# Patient Record
Sex: MALE | Race: WHITE | NOT HISPANIC OR LATINO | Employment: STUDENT | URBAN - METROPOLITAN AREA
[De-identification: names, ages, dates, MRNs, and addresses within clinical notes are randomized per-mention and may not be internally consistent; named-entity substitution may affect disease eponyms.]

---
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
## 2019-02-04 ENCOUNTER — OFFICE VISIT (OUTPATIENT)
Dept: AUDIOLOGY | Facility: CLINIC | Age: 19
End: 2019-02-04
Payer: COMMERCIAL

## 2019-02-04 ENCOUNTER — OFFICE VISIT (OUTPATIENT)
Dept: OTOLARYNGOLOGY | Facility: CLINIC | Age: 19
End: 2019-02-04
Payer: COMMERCIAL

## 2019-02-04 VITALS
OXYGEN SATURATION: 98 % | HEART RATE: 67 BPM | WEIGHT: 208 LBS | BODY MASS INDEX: 29.12 KG/M2 | DIASTOLIC BLOOD PRESSURE: 68 MMHG | HEIGHT: 71 IN | SYSTOLIC BLOOD PRESSURE: 126 MMHG

## 2019-02-04 DIAGNOSIS — J34.2 DEVIATED NASAL SEPTUM: ICD-10-CM

## 2019-02-04 DIAGNOSIS — H61.23 BILATERAL IMPACTED CERUMEN: ICD-10-CM

## 2019-02-04 DIAGNOSIS — H92.03 OTALGIA OF BOTH EARS: Primary | ICD-10-CM

## 2019-02-04 DIAGNOSIS — R04.0 EPISTAXIS: Primary | ICD-10-CM

## 2019-02-04 DIAGNOSIS — H69.90 DYSFUNCTION OF EUSTACHIAN TUBE, UNSPECIFIED LATERALITY: ICD-10-CM

## 2019-02-04 PROCEDURE — 92550 TYMPANOMETRY & REFLEX THRESH: CPT | Performed by: AUDIOLOGIST

## 2019-02-04 PROCEDURE — 99203 OFFICE O/P NEW LOW 30 MIN: CPT | Mod: 25 | Performed by: OTOLARYNGOLOGY

## 2019-02-04 PROCEDURE — 92557 COMPREHENSIVE HEARING TEST: CPT | Performed by: AUDIOLOGIST

## 2019-02-04 PROCEDURE — 30901 CONTROL OF NOSEBLEED: CPT | Performed by: OTOLARYNGOLOGY

## 2019-02-04 PROCEDURE — 99207 ZZC NO CHARGE LOS: CPT | Performed by: AUDIOLOGIST

## 2019-02-04 PROCEDURE — 69210 REMOVE IMPACTED EAR WAX UNI: CPT | Mod: 51 | Performed by: OTOLARYNGOLOGY

## 2019-02-04 ASSESSMENT — MIFFLIN-ST. JEOR: SCORE: 1980.61

## 2019-02-04 NOTE — LETTER
2/4/2019         RE: Aditya Allen  [address] [facility] [address]        Dear Colleague,    Thank you for referring your patient, Aditya Allen, to the [facility]. Please see a copy of my visit note below.    ENT Consultation    Aditya Allen is a 18 year old male who is seen in consultation at the request of self referred.      History of Present Illness - Aditya Allen is a 18 year old male here today for concerns of hearing loss. Patient does Fields but wears hearing protection. Patient does also have occasional bloody nose.  Regard to his ears he does get crackling sounds in both ears left slightly worse than right.  He does have allergies to use Allegra using Claritin.  He denies use nasal steroid sprays because of history of nosebleeds especially the left side.  Other than that really that does not seem to help his ears.  Feel a bit plugged but again the crackling and popping noises been bothering him.  He does not have any other history of bleeding disorders no bleeding gums or easy bruising.  Body mass index is 29.01 kg/m .        BP Readings from Last 1 Encounters:   02/04/19 126/68 (62 %/ 33 %)*     *BP percentiles are based on the August 2017 AAP Clinical Practice Guideline for boys       BP noted to be well controlled today in office.     Aditya IS NOT a smoker/uses chewing tobacco.        Past Medical History -   Past Medical History:   Diagnosis Date     Fever and other physiologic disturbances of temperature regulation     pyrexia       Current Medications -   Current Outpatient Medications:      loratadine (CLARITIN) 10 MG tablet, Take 1 tablet (10 mg) by mouth daily, Disp: , Rfl:      VITAMIN D, CHOLECALCIFEROL, PO, Take by mouth daily Reported on 3/17/2017, Disp: , Rfl:     Allergies -   Allergies   Allergen Reactions     No Known Allergies        Social History -   Social History     Socioeconomic History     Marital status: Single     Spouse name: Not on file      "Number of children: 0     Years of education: Not on file     Highest education level: Not on file   Social Needs     Financial resource strain: Not on file     Food insecurity - worry: Not on file     Food insecurity - inability: Not on file     Transportation needs - medical: Not on file     Transportation needs - non-medical: Not on file   Occupational History     Not on file   Tobacco Use     Smoking status: Never Smoker     Smokeless tobacco: Never Used   Substance and Sexual Activity     Alcohol use: No     Drug use: No     Sexual activity: No   Other Topics Concern     Not on file   Social History Narrative     Not on file       Family History - No family history on file.    Review of Systems - As per HPI and PMHx, otherwise review of system review of the head and neck negative. Otherwise 10+ review systems negative.    Physical Exam  /68   Pulse 67   Ht 1.803 m (5' 11\")   Wt 94.3 kg (208 lb)   SpO2 98%   BMI 29.01 kg/m     BMI: Body mass index is 29.01 kg/m .    General - The patient is well nourished and well developed, and appears to have good nutritional status.  Alert and oriented to person and place, answers questions and cooperates with examination appropriately.    SKIN - No suspicious lesions or rashes.  Respiration - No respiratory distress.  Head and Face - Normocephalic and atraumatic, with no gross asymmetry noted of the contour of the facial features.  The facial nerve is intact, with strong symmetric movements.    Voice and Breathing - The patient was breathing comfortably without the use of accessory muscles. The patients voice was clear and strong, and had appropriate pitch and quality.    Ears - Bilateral pinna and EACs with normal appearing overlying skin. Tympanic membrane intact with good mobility on pneumatic otoscopy bilaterally. Bony landmarks of the ossicular chain are normal. The tympanic membranes are normal in appearance. No retraction, perforation, or masses.  No fluid " or purulence was seen in the external canal or the middle ear.     Eyes - Extraocular movements intact.  Sclera were not icteric or injected, conjunctiva were pink and moist.    Mouth - Examination of the oral cavity showed pink, healthy oral mucosa. No lesions or ulcerations noted.  The tongue was mobile and midline, and the dentition were in good condition.      Throat - The walls of the oropharynx were smooth, pink, moist, symmetric, and had no lesions or ulcerations.  The tonsillar pillars and soft palate were symmetric.  The uvula was midline on elevation.    Neck - Normal midline excursion of the laryngotracheal complex during swallowing.  Full range of motion on passive movement.  Palpation of the occipital, submental, submandibular, internal jugular chain, and supraclavicular nodes did not demonstrate any abnormal lymph nodes or masses.  The carotid pulse was palpable bilaterally.  Palpation of the thyroid was soft and smooth, with no nodules or goiter appreciated.  The trachea was mobile and midline.    Nose - External contour is symmetric, no gross deflection or scars.  Nasal mucosa is shows prominent blood vessels pressure in the left side.  Blood vessel is beginning to bleed.  Patient has deviated septum to the left side some of the large inferior turbinates little bit of pallor.  Neuro - Nonfocal neuro exam is normal, CN 2 through 12 intact, normal gait and muscle tone.      Performed in clinic today:  Ears - On examination of the ears, I found that both ears were impacted with cerumen.  , I positioned the patient in the examination chair in a semi-supine position. I used the magnified speculum /binocular surgical microscope to perform cerumen removal.  On the right side, I began by using a cerumen loop to gently lift the edges of the cerumen mass away from the walls of the external canal.  Once I did this, I was able to use alligator froceps to  pull/suction away fragments of wax and debris. I removed  all the wax and debri.  The tympanic membrane was intact, no sign of perforation or middle ear effusion. and On the left side once again using the magnified speculum/ binocular surgical microscope to perform cerumen removal.  I began by using a cerumen loop to gently lift the edges of the cerumen mass away from the walls of the external canal.  Once I did this, I was able to use alligator froceps to pull/suction away fragments of wax and debris. I removed all the wax and debri. The tympanic membrane was intact, no sign of perforation or middle ear effusion.  Cerumen was very close to TM on each side.      Also control of anterior epistaxis was performed on the left side using silver nitrate.  Patient was topically anesthetized with 4% tetracaine gel anteriorly.  After about 10 minutes V silver nitrate with magnified speculum to carefully treat the blood vessel anteriorly that was beginning to bleed.  He tolerated very well.    A/P - Aditya Allen is a 18 year old male with cerumen impaction partly causing some of his feeling of crackling and popping and pressure in the ears.  Certainly there is also eustachian tube dysfunction issue.  We discussed using nasal saline that will help with nosebleeds as well as eustachian tube dysfunction.  He will see us back as needed.  In regard to epistaxis again good humidification is very important at this point.      Wale Gonzales MD    Again, thank you for allowing me to participate in the care of your patient.        Sincerely,        Wale Gonzales MD, MD

## 2019-02-04 NOTE — PROGRESS NOTES
AUDIOLOGY REPORT     SUMMARY: Audiology visit completed. See audiogram for results.     RECOMMENDATIONS: Follow-up with ENT    Jordan Wong Licensed Audiologist #[id number]

## 2019-02-04 NOTE — PROGRESS NOTES
ENT Consultation    Aditya Allen is a 18 year old male who is seen in consultation at the request of self referred.      History of Present Illness - Aditya Allen is a 18 year old male here today for concerns of hearing loss. Patient does Fields but wears hearing protection. Patient does also have occasional bloody nose.  Regard to his ears he does get crackling sounds in both ears left slightly worse than right.  He does have allergies to use Allegra using Claritin.  He denies use nasal steroid sprays because of history of nosebleeds especially the left side.  Other than that really that does not seem to help his ears.  Feel a bit plugged but again the crackling and popping noises been bothering him.  He does not have any other history of bleeding disorders no bleeding gums or easy bruising.  Body mass index is 29.01 kg/m .        BP Readings from Last 1 Encounters:   02/04/19 126/68 (62 %/ 33 %)*     *BP percentiles are based on the August 2017 AAP Clinical Practice Guideline for boys       BP noted to be well controlled today in office.     Aditya IS NOT a smoker/uses chewing tobacco.        Past Medical History -   Past Medical History:   Diagnosis Date     Fever and other physiologic disturbances of temperature regulation     pyrexia       Current Medications -   Current Outpatient Medications:      loratadine (CLARITIN) 10 MG tablet, Take 1 tablet (10 mg) by mouth daily, Disp: , Rfl:      VITAMIN D, CHOLECALCIFEROL, PO, Take by mouth daily Reported on 3/17/2017, Disp: , Rfl:     Allergies -   Allergies   Allergen Reactions     No Known Allergies        Social History -   Social History     Socioeconomic History     Marital status: Single     Spouse name: Not on file     Number of children: 0     Years of education: Not on file     Highest education level: Not on file   Social Needs     Financial resource strain: Not on file     Food insecurity - worry: Not on file     Food insecurity - inability: Not on file  "    Transportation needs - medical: Not on file     Transportation needs - non-medical: Not on file   Occupational History     Not on file   Tobacco Use     Smoking status: Never Smoker     Smokeless tobacco: Never Used   Substance and Sexual Activity     Alcohol use: No     Drug use: No     Sexual activity: No   Other Topics Concern     Not on file   Social History Narrative     Not on file       Family History - No family history on file.    Review of Systems - As per HPI and PMHx, otherwise review of system review of the head and neck negative. Otherwise 10+ review systems negative.    Physical Exam  /68   Pulse 67   Ht 1.803 m (5' 11\")   Wt 94.3 kg (208 lb)   SpO2 98%   BMI 29.01 kg/m    BMI: Body mass index is 29.01 kg/m .    General - The patient is well nourished and well developed, and appears to have good nutritional status.  Alert and oriented to person and place, answers questions and cooperates with examination appropriately.    SKIN - No suspicious lesions or rashes.  Respiration - No respiratory distress.  Head and Face - Normocephalic and atraumatic, with no gross asymmetry noted of the contour of the facial features.  The facial nerve is intact, with strong symmetric movements.    Voice and Breathing - The patient was breathing comfortably without the use of accessory muscles. The patients voice was clear and strong, and had appropriate pitch and quality.    Ears - Bilateral pinna and EACs with normal appearing overlying skin. Tympanic membrane intact with good mobility on pneumatic otoscopy bilaterally. Bony landmarks of the ossicular chain are normal. The tympanic membranes are normal in appearance. No retraction, perforation, or masses.  No fluid or purulence was seen in the external canal or the middle ear.     Eyes - Extraocular movements intact.  Sclera were not icteric or injected, conjunctiva were pink and moist.    Mouth - Examination of the oral cavity showed pink, healthy oral " mucosa. No lesions or ulcerations noted.  The tongue was mobile and midline, and the dentition were in good condition.      Throat - The walls of the oropharynx were smooth, pink, moist, symmetric, and had no lesions or ulcerations.  The tonsillar pillars and soft palate were symmetric.  The uvula was midline on elevation.    Neck - Normal midline excursion of the laryngotracheal complex during swallowing.  Full range of motion on passive movement.  Palpation of the occipital, submental, submandibular, internal jugular chain, and supraclavicular nodes did not demonstrate any abnormal lymph nodes or masses.  The carotid pulse was palpable bilaterally.  Palpation of the thyroid was soft and smooth, with no nodules or goiter appreciated.  The trachea was mobile and midline.    Nose - External contour is symmetric, no gross deflection or scars.  Nasal mucosa is shows prominent blood vessels pressure in the left side.  Blood vessel is beginning to bleed.  Patient has deviated septum to the left side some of the large inferior turbinates little bit of pallor.  Neuro - Nonfocal neuro exam is normal, CN 2 through 12 intact, normal gait and muscle tone.      Performed in clinic today:  Ears - On examination of the ears, I found that both ears were impacted with cerumen.  , I positioned the patient in the examination chair in a semi-supine position. I used the magnified speculum /binocular surgical microscope to perform cerumen removal.  On the right side, I began by using a cerumen loop to gently lift the edges of the cerumen mass away from the walls of the external canal.  Once I did this, I was able to use alligator froceps to  pull/suction away fragments of wax and debris. I removed all the wax and debri.  The tympanic membrane was intact, no sign of perforation or middle ear effusion. and On the left side once again using the magnified speculum/ binocular surgical microscope to perform cerumen removal.  I began by using  a cerumen loop to gently lift the edges of the cerumen mass away from the walls of the external canal.  Once I did this, I was able to use alligator froceps to pull/suction away fragments of wax and debris. I removed all the wax and debri. The tympanic membrane was intact, no sign of perforation or middle ear effusion.  Cerumen was very close to TM on each side.      Also control of anterior epistaxis was performed on the left side using silver nitrate.  Patient was topically anesthetized with 4% tetracaine gel anteriorly.  After about 10 minutes V silver nitrate with magnified speculum to carefully treat the blood vessel anteriorly that was beginning to bleed.  He tolerated very well.    A/P - Aditya Allen is a 18 year old male with cerumen impaction partly causing some of his feeling of crackling and popping and pressure in the ears.  Certainly there is also eustachian tube dysfunction issue.  We discussed using nasal saline that will help with nosebleeds as well as eustachian tube dysfunction.  He will see us back as needed.  In regard to epistaxis again good humidification is very important at this point.      Wale Gonzales MD

## 2019-04-02 NOTE — PROGRESS NOTES
Outpatient Physical Therapy Discharge Note     Patient: Aditya Allen  : 2000    Beginning/End Dates of Reporting Period:  2018 to 2018  Aditya has been seen for a total of 4 visits overall.    Referring Provider: Dr. Zendejas    Therapy Diagnosis: Left ankle stiffness following ORIF of fibula     Client Self Report: Reports that he can still feel the plate in the ankle. He states that he is always aware of it and that hasn't changed even with the improved ankle ROM. He rates it as a 1/10. He would like to continue to work on his ROM exercises but if in a year from now he can still feel it he will look into having the hardware removed.     Objective Measurements:  Objective Measure: LEFS  Details:     Objective Measure: ROM  Details: DF 15, PF 70, Inv. 32, Ever 12.    Objective Measure: SLS  Details: 1 min.     Objective Measure: Strength  Details: 5/5 throughout the ankle.      Treatment: Ankle ROM, stretching, balance and proprioception, HEP.    Goals:  Goal Identifier Home management   Goal Description Pt. will demonstrate independence with his home program.   Target Date 18   Date Met  18   Progress:     Goal Identifier ROM   Goal Description Pt. will demonstrate left ankle ROM equal to that of the right, demonstrating a return to normal ROM   Target Date 18   Date Met  18   Progress:     Goal Identifier Function   Goal Description Pt. will report that he no longer feels stiffness in the left ankle, return to symptom free walking and running.   Target Date 19   Date Met      Progress:     Progress Toward Goals:   Progress this reporting period: Good progress toward goals, continues to work on the 3rd goal.     Plan:  Discharge from therapy.    Discharge: Yes    Reason for Discharge: Independent with home program, meeting goals.     Equipment Issued: Theraband    Discharge Plan: Patient to continue home program.    Thank you for this referral.    Emma Mejia  DAKOTA

## 2019-04-02 NOTE — ADDENDUM NOTE
Encounter addended by: Emma Mejia, PT on: 4/2/2019 9:49 AM   Actions taken: Episode resolved, Sign clinical note

## 2019-04-28 ENCOUNTER — HOSPITAL ENCOUNTER (EMERGENCY)
Facility: CLINIC | Age: 19
Discharge: HOME | End: 2019-04-28
Attending: FAMILY MEDICINE | Admitting: FAMILY MEDICINE
Payer: COMMERCIAL

## 2019-04-28 ENCOUNTER — APPOINTMENT (OUTPATIENT)
Dept: GENERAL RADIOLOGY | Facility: CLINIC | Age: 19
End: 2019-04-28
Attending: FAMILY MEDICINE
Payer: COMMERCIAL

## 2019-04-28 VITALS
HEART RATE: 67 BPM | TEMPERATURE: 97.9 F | DIASTOLIC BLOOD PRESSURE: 83 MMHG | RESPIRATION RATE: 18 BRPM | OXYGEN SATURATION: 98 % | SYSTOLIC BLOOD PRESSURE: 129 MMHG

## 2019-04-28 DIAGNOSIS — S62.341A CLOSED NONDISPLACED FRACTURE OF BASE OF SECOND METACARPAL BONE OF LEFT HAND, INITIAL ENCOUNTER: Primary | ICD-10-CM

## 2019-04-28 PROCEDURE — 25000132 ZZH RX MED GY IP 250 OP 250 PS 637: Performed by: FAMILY MEDICINE

## 2019-04-28 PROCEDURE — 26600 TREAT METACARPAL FRACTURE: CPT | Mod: LT | Performed by: FAMILY MEDICINE

## 2019-04-28 PROCEDURE — 99283 EMERGENCY DEPT VISIT LOW MDM: CPT | Mod: 25 | Performed by: FAMILY MEDICINE

## 2019-04-28 PROCEDURE — 99284 EMERGENCY DEPT VISIT MOD MDM: CPT | Mod: 25 | Performed by: FAMILY MEDICINE

## 2019-04-28 PROCEDURE — 73130 X-RAY EXAM OF HAND: CPT | Mod: TC,LT

## 2019-04-28 RX ORDER — HYDROCODONE BITARTRATE AND ACETAMINOPHEN 5; 325 MG/1; MG/1
1 TABLET ORAL ONCE
Status: COMPLETED | OUTPATIENT
Start: 2019-04-28 | End: 2019-04-28

## 2019-04-28 RX ORDER — IBUPROFEN 800 MG/1
800 TABLET, FILM COATED ORAL ONCE
Status: COMPLETED | OUTPATIENT
Start: 2019-04-28 | End: 2019-04-28

## 2019-04-28 RX ADMIN — HYDROCODONE BITARTRATE AND ACETAMINOPHEN 1 TABLET: 5; 325 TABLET ORAL at 00:49

## 2019-04-28 RX ADMIN — IBUPROFEN 800 MG: 800 TABLET ORAL at 00:49
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
## 2019-04-28 NOTE — ED PROVIDER NOTES
ED Provider Note     Aditya Allen  [id number]  April 28, 2019      CC:     Chief Complaint   Patient presents with     Hand Injury          History is obtained from the patient.  He is accompanied by his mother and girlfriend.    HPI: Aditya Allen is a 18 year old male presenting with injury to the left hand especially over the thumb and index finger, at the second metacarpal phalangeal joint.  Patient was at a bonfire, and was trying to break a piece of log against a tree.  The long piece of log and came back and hit him in between the webspace of the thumb and index finger.  Patient reports intense pain initially, resulting in some nausea.  Pain is now subsiding to level of 7/10.  Patient was nauseated earlier.  He has inability to fully bend the index finger, and most of the pain is at the second MCP joint.  Patient is right-hand dominant.  He has not taken anything for the pain.  He has a history of seasonal allergies and allergy to cats.  He takes Zyrtec as needed.      PMH/Problem List:   Past Medical History:   Diagnosis Date     Fever and other physiologic disturbances of temperature regulation        PSH:   Past Surgical History:   Procedure Laterality Date     EXAM UNDER ANESTHESIA, MANIPULATE JOINT (LOCATION) Left 1/6/2015    Procedure: EXAM UNDER ANESTHESIA, MANIPULATE JOINT (LOCATION);  Surgeon: Bobo Zendejas DO;  Location: [facility]     NO HISTORY OF SURGERY       OPEN REDUCTION INTERNAL FIXATION ANKLE Left 1/6/2015    Procedure: OPEN REDUCTION INTERNAL FIXATION ANKLE;  Surgeon: Bobo Zednejas DO;  Location: [facility]       MEDS:     No current facility-administered medications on file prior to encounter.   Current Outpatient Medications on File Prior to Encounter:  loratadine (CLARITIN) 10 MG tablet Take 1 tablet (10 mg) by mouth daily   VITAMIN D, CHOLECALCIFEROL, PO Take by mouth daily Reported on 3/17/2017        Allergies: No known allergies    Triage and nursing notes were reviewed.    ROS: All other systems were reviewed and are negative    Physical Exam:  Vitals:    04/28/19 0037 04/28/19 0136 04/28/19 0143   BP: (!) 142/99 129/83 129/83   Pulse: 79 66 67   Resp: 18     Temp: 97.9  F (36.6  C)     TempSrc: Oral     SpO2: 98% 98% 98%     GENERAL APPEARANCE: Alert and oriented x3, mild distress due to pain  HEAD: atraumatic  EYES: PER  HENT: Normal external exam  RESP: Normal respiratory effort  EXT: Left hand with swelling over the proximal phalanx, and tenderness over the second MCP joint.  There is some tenderness over the first MCP joint but there is no deformity.  Distal phalanx and nailbed appears normal with good cap refill.  SKIN: no rash; as above  NEURO: mentation and speech normal; no focal deficits    Labs/Imaging Results:  Results for orders placed or performed during the hospital encounter of 04/28/19 (from the past 24 hour(s))   XR Hand Left G/E 3 Views    Narrative    XR HAND LT G/E 3 VW 4/28/2019 12:50 AM    HISTORY: Trauma; Log hit him in the webspace of thumb and index  finger.    COMPARISON: None.    FINDINGS: Nondisplaced fracture through the base of the second  metacarpal. Osseous structures otherwise appear intact.      Impression    IMPRESSION: Nondisplaced fracture through the base of the second  metacarpal.    JENNY RIOS MD           Impression:  Final diagnoses:   Closed nondisplaced fracture of base of second metacarpal bone of left hand         ED Course & Medical Decision Making (Plan):  Aditya Allen is a 18 year old male with left hand injury sustained tonight as he was trying to break a long piece of log against a tree.  He came back and struck him in the webspace between the thumb and index finger.  He thought that he might of dislocated the left index finger.  The patient arrived in the emergency department approximately 30 minutes after the injury.  He has pain in the first  metacarpal phalangeal joint and along the mid hand.  There is swelling of the left index finger.  The right thumb appears normal with no deformity.  X-ray of the left hand reveals nondisplaced fracture through the base of the second metacarpal bone.  We reviewed the x-rays together, and patient was then placed in a wrist splint to minimize movement of the hand and wrist.  He will still be able to use his fingers.  I asked him to ice and elevate frequently for the next couple of days.  Patient received ibuprofen and 1 dose of Vicodin in the ED.  Continue ibuprofen at home and add Tylenol for breakthrough pain.  Follow-up in the clinic in approximately 5 days for recheck.  Return to the ED at any time if symptoms worsen.  Patient will likely need nonsurgical management of his proximal metacarpal fracture.    Written after-visit summary and instructions were given at the time of discharge.          Follow Up Plan:  Clinic, [address] [id number] [phone]    In 5 days      [facility] Emergency Department  [address]   [place] [place] [address] [phone]    If symptoms worsen        Discharge Meds: None        This note was completed in part using Dragon voice recognition, and may contain word and grammatical errors.        Cynthia Hawley MD  04/28/19 0349

## 2019-04-28 NOTE — DISCHARGE INSTRUCTIONS
You have a hand fracture involving the left second metacarpal bone at the base.  Please see the attached handout.  Ice and elevate as much as possible for the next several days.  Wear the wrist splint for comfort.  Follow-up in the clinic in 5-7 days for recheck.  Take ibuprofen up to 600 mg 4 times a day with food.  Add Tylenol for breakthrough pain.

## 2019-04-28 NOTE — ED AVS SNAPSHOT
[facility] Emergency Department  [address] [place] [address]  Phone:  [phone]  Fax:  [phone]                                    Aditya Allen   MRN: [id number]    Department:  [facility] Emergency Department   Date of Visit:  4/28/2019           After Visit Summary Signature Page    I have received my discharge instructions, and my questions have been answered. I have discussed any challenges I see with this plan with the nurse or doctor.    ..........................................................................................................................................  Patient/Patient Representative Signature      ..........................................................................................................................................  Patient Representative Print Name and Relationship to Patient    ..................................................               ................................................  Date                                   Time    ..........................................................................................................................................  Reviewed by Signature/Title    ...................................................              ..............................................  Date                                               Time          22EPIC Rev 08/18

## 2019-04-29 NOTE — PROGRESS NOTES
SUBJECTIVE:   Aditya Allen is a 18 year old male who presents to clinic today for the following health issues:      HPI  ED/UC Followup:    Facility:  [facility]  Date of visit: 04/28/19  Reason for visit: Closed nondisplaced fracture of base of second metacarpal bone of left hand       Current Status: Patient states he has no pain unless he bumps the hand or pulls hard on a door handle       Additional history: Nondisplaced fracture of the base the second metatarsal noted on 28 April 2019 well preparing a bonfire.  Minimal discomfort is noted unless he misuses the hand.  We stressed that he needs to be very careful with the use of this hand for the next 6 weeks.    Reviewed and updated as needed this visit by clinical staff         Reviewed and updated as needed this visit by Provider         Patient Active Problem List   Diagnosis     Allergic rhinitis     Patellar tendinitis of right knee     Closed bimalleolar fracture     Closed fracture of ankle     Nondisplaced fracture of base of second metacarpal bone, left hand, initial encounter for closed fracture     Past Surgical History:   Procedure Laterality Date     EXAM UNDER ANESTHESIA, MANIPULATE JOINT (LOCATION) Left 1/6/2015    Procedure: EXAM UNDER ANESTHESIA, MANIPULATE JOINT (LOCATION);  Surgeon: Bobo Zendejas DO;  [facility]     NO HISTORY OF SURGERY       OPEN REDUCTION INTERNAL FIXATION ANKLE Left 1/6/2015    Procedure: OPEN REDUCTION INTERNAL FIXATION ANKLE;  Surgeon: Bobo Zendejas DO;  Location: [facility]       Social History     Tobacco Use     Smoking status: Never Smoker     Smokeless tobacco: Never Used   Substance Use Topics     Alcohol use: No     History reviewed. No pertinent family history.      Current Outpatient Medications   Medication Sig Dispense Refill     loratadine (CLARITIN) 10 MG tablet Take 1 tablet (10 mg) by mouth daily       VITAMIN D, CHOLECALCIFEROL, PO Take by mouth daily Reported on 3/17/2017    "    Allergies   Allergen Reactions     No Known Allergies      No lab results found.   BP Readings from Last 3 Encounters:   05/03/19 118/60   04/28/19 129/83   02/04/19 126/68    Wt Readings from Last 3 Encounters:   05/03/19 96.1 kg (211 lb 12.8 oz) (95 %)*   02/04/19 94.3 kg (208 lb) (95 %)*   11/01/18 94.3 kg (208 lb) (95 %)*     * Growth percentiles are based on CDC (Boys, 2-20 Years) data.                  Labs reviewed in EPIC    ROS:  Constitutional, HEENT, cardiovascular, pulmonary, gi and gu systems are negative, except as otherwise noted.    OBJECTIVE:     /60 (Cuff Size: Adult Large)   Pulse 80   Temp 98.7  F (37.1  C) (Temporal)   Resp 16   Ht 1.799 m (5' 10.83\")   Wt 96.1 kg (211 lb 12.8 oz)   BMI 29.68 kg/m    Body mass index is 29.68 kg/m .  GENERAL: healthy, alert and no distress  MS: Left hand is reviewed today with the patient he has minimal pain and tenderness over the site of the fracture to the anterior aspect.  Posterior aspect there is some pain to this region.  Some mild swelling is still noted with respect to this fracture area.  No bruising is present at this point in time.  We reviewed the x-rays with him while in the clinic today.  And note a very minimally displaced fracture at the site listed.  SKIN: no suspicious lesions or rashes to visible skin  NEURO: Normal strength and tone, mentation intact and speech normal  PSYCH: mentation appears normal, affect normal/bright    Diagnostic Test Results:  No results found for this or any previous visit (from the past 24 hour(s)).    ASSESSMENT/PLAN:     1. Nondisplaced fracture of base of second metacarpal bone, left hand, initial encounter for closed fracture  At this point time we discussed the options of potential for casting or continued use of splint.  He is hesitant to use a cast since this will limit what he is able to do with respect to his employment or his volunteer activity.  I advised that he continue to use the splint " that he was granted through the ER and if no improvement is noted over the next week to be seen by orthopedic for further evaluation and consideration of treatment options.  - ORTHOPEDICS ADULT REFERRAL    TAMRA ZavalaC  [facility]

## 2019-05-03 ENCOUNTER — OFFICE VISIT (OUTPATIENT)
Dept: FAMILY MEDICINE | Facility: OTHER | Age: 19
End: 2019-05-03
Payer: COMMERCIAL

## 2019-05-03 VITALS
SYSTOLIC BLOOD PRESSURE: 118 MMHG | RESPIRATION RATE: 16 BRPM | DIASTOLIC BLOOD PRESSURE: 60 MMHG | TEMPERATURE: 98.7 F | HEART RATE: 80 BPM | HEIGHT: 71 IN | BODY MASS INDEX: 29.65 KG/M2 | WEIGHT: 211.8 LBS

## 2019-05-03 DIAGNOSIS — S62.341A NONDISPLACED FRACTURE OF BASE OF SECOND METACARPAL BONE, LEFT HAND, INITIAL ENCOUNTER FOR CLOSED FRACTURE: Primary | ICD-10-CM

## 2019-05-03 PROCEDURE — 99213 OFFICE O/P EST LOW 20 MIN: CPT | Performed by: PHYSICIAN ASSISTANT

## 2019-05-03 ASSESSMENT — MIFFLIN-ST. JEOR: SCORE: 1995.1

## 2019-05-03 ASSESSMENT — PAIN SCALES - GENERAL: PAINLEVEL: NO PAIN (0)

## 2020-03-11 DIAGNOSIS — Z01.84 IMMUNITY STATUS TESTING: Primary | ICD-10-CM

## 2020-03-12 DIAGNOSIS — Z01.84 IMMUNITY STATUS TESTING: ICD-10-CM

## 2020-03-12 LAB — HBV SURFACE AB SERPL IA-ACNC: 1.21 M[IU]/ML

## 2020-03-12 PROCEDURE — 86706 HEP B SURFACE ANTIBODY: CPT | Performed by: FAMILY MEDICINE

## 2020-03-12 PROCEDURE — 36415 COLL VENOUS BLD VENIPUNCTURE: CPT | Performed by: FAMILY MEDICINE

## 2020-05-11 ENCOUNTER — ALLIED HEALTH/NURSE VISIT (OUTPATIENT)
Dept: FAMILY MEDICINE | Facility: OTHER | Age: 20
End: 2020-05-11

## 2020-05-11 DIAGNOSIS — Z23 NEED FOR VACCINATION: Primary | ICD-10-CM

## 2020-05-11 PROCEDURE — 90471 IMMUNIZATION ADMIN: CPT

## 2020-05-11 PROCEDURE — 90746 HEPB VACCINE 3 DOSE ADULT IM: CPT

## 2020-05-11 PROCEDURE — 99207 ZZC NO CHARGE NURSE ONLY: CPT

## 2020-05-11 NOTE — PROGRESS NOTES
Chief Complaint   Patient presents with     Allied Health Visit     #1 afer antibody testing     Prior to immunization administration, verified patients identity using patient s name and date of birth. Please see Immunization Activity for additional information.     Screening Questionnaire for Adult Immunization    Are you sick today?   No   Do you have allergies to medications, food, a vaccine component or latex?   No   Have you ever had a serious reaction after receiving a vaccination?   No   Do you have a long-term health problem with heart, lung, kidney, or metabolic disease (e.g., diabetes), asthma, a blood disorder, no spleen, complement component deficiency, a cochlear implant, or a spinal fluid leak?  Are you on long-term aspirin therapy?   No   Do you have cancer, leukemia, HIV/AIDS, or any other immune system problem?   No   Do you have a parent, brother, or sister with an immune system problem?   No   In the past 3 months, have you taken medications that affect  your immune system, such as prednisone, other steroids, or anticancer drugs; drugs for the treatment of rheumatoid arthritis, Crohn s disease, or psoriasis; or have you had radiation treatments?   No   Have you had a seizure, or a brain or other nervous system problem?   No   During the past year, have you received a transfusion of blood or blood    products, or been given immune (gamma) globulin or antiviral drug?   No   For women: Are you pregnant or is there a chance you could become       pregnant during the next month?   No   Have you received any vaccinations in the past 4 weeks?   No     Immunization questionnaire answers were all negative.        Per orders of Dr. Leong, injection of hep b given by Xin Mcdonough CMA. Patient instructed to remain in clinic for 15 minutes afterwards, and to report any adverse reaction to me immediately.       Screening performed by Xin Mcdonough CMA on 5/11/2020 at 9:24 AM.'

## 2022-11-17 DIAGNOSIS — Z31.41 FERTILITY TESTING: Primary | ICD-10-CM

## 2022-11-28 ENCOUNTER — LAB (OUTPATIENT)
Dept: LAB | Facility: CLINIC | Age: 22
End: 2022-11-28
Payer: COMMERCIAL

## 2022-11-28 DIAGNOSIS — Z31.41 FERTILITY TESTING: ICD-10-CM

## 2022-11-28 PROCEDURE — 89322 SEMEN ANAL STRICT CRITERIA: CPT

## 2022-11-29 LAB
ABNORMAL SPERM MORPHOLOGY: 92
ABSTINENCE DAYS: 3 DAYS (ref 2–7)
AGGLUTINATION: NO
ANALYSIS TEMP - CENTIGRADE: 22 CENTIGRADE
COLLECTION METHOD: NORMAL
COLLECTION SITE: NORMAL
CONSENT TO RELEASE TO PARTNER: YES
DAL- RECEIVED TIME: NORMAL
HEAD DEFECT: 90 %
IMMOTILE: 51 %
LIQUEFIED: YES
MIDPIECE DEFECT: 28 %
NON-PROGRESSIVE MOTILITY: 3 %
NORMAL SPERM MORPHOLOGY: 8 % NORMAL FORMS
PROGRESSIVE MOTILITY: 46 %
ROUND CELLS: 0.1 MILLION/ML
SPECIMEN PH: 8 PH
SPECIMEN VOLUME: 1.7 ML
SPERM CONCENTRATION: 61.8 MILLION/ML
TAIL DEFECT: 3 %
TIME OF ANALYSIS: NORMAL
TOTAL PROGRESSIVE MOTILE NUMBER: 48 MILLION
TOTAL SPERM NUMBER: 105 MILLION
VISCOUS: NO
VITALITY: NORMAL

## 2023-02-18 ENCOUNTER — HEALTH MAINTENANCE LETTER (OUTPATIENT)
Age: 23
End: 2023-02-18

## 2024-03-16 ENCOUNTER — HEALTH MAINTENANCE LETTER (OUTPATIENT)
Age: 24
End: 2024-03-16

## 2025-03-22 ENCOUNTER — HEALTH MAINTENANCE LETTER (OUTPATIENT)
Age: 25
End: 2025-03-22

## 2025-07-29 ENCOUNTER — HOSPITAL ENCOUNTER (EMERGENCY)
Facility: CLINIC | Age: 25
Discharge: HOME | End: 2025-07-29
Attending: EMERGENCY MEDICINE
Payer: COMMERCIAL

## 2025-07-29 ENCOUNTER — OFFICE VISIT (OUTPATIENT)
Dept: URGENT CARE | Facility: URGENT CARE | Age: 25
End: 2025-07-29
Payer: COMMERCIAL

## 2025-07-29 ENCOUNTER — APPOINTMENT (OUTPATIENT)
Dept: ULTRASOUND IMAGING | Facility: CLINIC | Age: 25
End: 2025-07-29
Attending: EMERGENCY MEDICINE
Payer: COMMERCIAL

## 2025-07-29 VITALS
TEMPERATURE: 97.4 F | DIASTOLIC BLOOD PRESSURE: 74 MMHG | OXYGEN SATURATION: 99 % | SYSTOLIC BLOOD PRESSURE: 129 MMHG | WEIGHT: 258 LBS | HEART RATE: 79 BPM | BODY MASS INDEX: 36.16 KG/M2 | RESPIRATION RATE: 15 BRPM

## 2025-07-29 VITALS
OXYGEN SATURATION: 99 % | DIASTOLIC BLOOD PRESSURE: 83 MMHG | SYSTOLIC BLOOD PRESSURE: 130 MMHG | WEIGHT: 258 LBS | BODY MASS INDEX: 36.16 KG/M2 | HEART RATE: 67 BPM | TEMPERATURE: 98.4 F | RESPIRATION RATE: 18 BRPM

## 2025-07-29 DIAGNOSIS — N43.40 SPERMATOCELE: Primary | ICD-10-CM

## 2025-07-29 DIAGNOSIS — N50.812 TESTICULAR PAIN, LEFT: Primary | ICD-10-CM

## 2025-07-29 LAB
ALBUMIN UR-MCNC: NEGATIVE MG/DL
APPEARANCE UR: CLEAR
BILIRUB UR QL STRIP: NEGATIVE
COLOR UR AUTO: YELLOW
GLUCOSE UR STRIP-MCNC: NEGATIVE MG/DL
HGB UR QL STRIP: NEGATIVE
KETONES UR STRIP-MCNC: ABNORMAL MG/DL
LEUKOCYTE ESTERASE UR QL STRIP: NEGATIVE
NITRATE UR QL: NEGATIVE
PH UR STRIP: 6 [PH] (ref 5–7)
SP GR UR STRIP: >=1.03 (ref 1–1.03)
UROBILINOGEN UR STRIP-ACNC: 1 E.U./DL

## 2025-07-29 PROCEDURE — 81003 URINALYSIS AUTO W/O SCOPE: CPT | Performed by: NURSE PRACTITIONER

## 2025-07-29 PROCEDURE — 87491 CHLMYD TRACH DNA AMP PROBE: CPT | Performed by: NURSE PRACTITIONER

## 2025-07-29 PROCEDURE — 3074F SYST BP LT 130 MM HG: CPT | Performed by: NURSE PRACTITIONER

## 2025-07-29 PROCEDURE — 99203 OFFICE O/P NEW LOW 30 MIN: CPT | Performed by: NURSE PRACTITIONER

## 2025-07-29 PROCEDURE — 99284 EMERGENCY DEPT VISIT MOD MDM: CPT | Mod: 25 | Performed by: EMERGENCY MEDICINE

## 2025-07-29 PROCEDURE — 87591 N.GONORRHOEAE DNA AMP PROB: CPT | Performed by: NURSE PRACTITIONER

## 2025-07-29 PROCEDURE — 76870 US EXAM SCROTUM: CPT

## 2025-07-29 PROCEDURE — 3078F DIAST BP <80 MM HG: CPT | Performed by: NURSE PRACTITIONER

## 2025-07-29 PROCEDURE — 99283 EMERGENCY DEPT VISIT LOW MDM: CPT | Performed by: EMERGENCY MEDICINE

## 2025-07-29 RX ORDER — CETIRIZINE HYDROCHLORIDE 10 MG/1
10 TABLET ORAL DAILY
COMMUNITY

## 2025-07-29 ASSESSMENT — ENCOUNTER SYMPTOMS
CHILLS: 0
VOMITING: 0
FEVER: 0
HEMATURIA: 0
FATIGUE: 0
WOUND: 0
DIFFICULTY URINATING: 0
FREQUENCY: 0
DYSURIA: 0
CONFUSION: 0
FLANK PAIN: 0
NAUSEA: 0

## 2025-07-29 ASSESSMENT — COLUMBIA-SUICIDE SEVERITY RATING SCALE - C-SSRS
2. HAVE YOU ACTUALLY HAD ANY THOUGHTS OF KILLING YOURSELF IN THE PAST MONTH?: NO
6. HAVE YOU EVER DONE ANYTHING, STARTED TO DO ANYTHING, OR PREPARED TO DO ANYTHING TO END YOUR LIFE?: NO
1. IN THE PAST MONTH, HAVE YOU WISHED YOU WERE DEAD OR WISHED YOU COULD GO TO SLEEP AND NOT WAKE UP?: NO

## 2025-07-29 ASSESSMENT — ACTIVITIES OF DAILY LIVING (ADL)
ADLS_ACUITY_SCORE: 41
ADLS_ACUITY_SCORE: 41

## 2025-07-29 NOTE — PATIENT INSTRUCTIONS
NP sent patient to the emergency room due to left testicular pain increased to 7-8/10 palpation [place]

## 2025-07-29 NOTE — ED PROVIDER NOTES
"  History     Chief Complaint   Patient presents with    Groin Pain     HPI  Aditya Allen is a 24 year old male who  presents to the emergency department with a painful lump on his left testicle. patient describes the lump as small, like a \"little bb,\" located on the top of the left testicle. pain started a few days ago and became significantly worse overnight while at work. Currently, the pain interferes with walking as his leg brushing against the area causes discomfort. Patient denies any trauma to the area. this is a new symptom for him, different from previous trauma-related testicular pain which resolved quickly. Pain is described as constant and gradually worsening. Patient was previously evaluated at [facility] where the examining physician had difficulty palpating the lump initially but eventually located it, causing increased pain during examination.  He was advised to come to the hospital in order to get an ultrasound.  Patient works night shift and reports not eating since 3-4 am this morning.      Allergies:  Allergies   Allergen Reactions    Cats Other (See Comments)     Eye, nose, throat irritation    Seasonal Allergies Other (See Comments)     Eye, nose, throat irritation       Problem List:    Patient Active Problem List    Diagnosis Date Noted    Nondisplaced fracture of base of second metacarpal bone, left hand, initial encounter for closed fracture 05/03/2019     Priority: Medium    Closed bimalleolar fracture 01/14/2015     Priority: Medium    Closed fracture of ankle 01/14/2015     Priority: Medium     Problem list name updated by automated process. Provider to review      Patellar tendinitis of right knee 11/15/2012     Priority: Medium    Allergic rhinitis 01/10/2008     Priority: Medium     Problem list name updated by automated process. Provider to review          Past Medical History:    Past Medical History:   Diagnosis Date    Fever and other physiologic disturbances of temperature " regulation        Past Surgical History:    Past Surgical History:   Procedure Laterality Date    EXAM UNDER ANESTHESIA, MANIPULATE JOINT (LOCATION) Left 1/6/2015    Procedure: EXAM UNDER ANESTHESIA, MANIPULATE JOINT (LOCATION);  Surgeon: Bobo Zendejas DO;  [facility]    NO HISTORY OF SURGERY      OPEN REDUCTION INTERNAL FIXATION ANKLE Left 1/6/2015    Procedure: OPEN REDUCTION INTERNAL FIXATION ANKLE;  Surgeon: Bobo Zendejas DO;  Location: [facility]       Family History:    No family history on file.    Social History:  Marital Status:  Single [1]  Social History     Tobacco Use    Smoking status: Never    Smokeless tobacco: Never   Vaping Use    Vaping status: Every Day    Substances: Nicotine   Substance Use Topics    Alcohol use: No    Drug use: No        Medications:    cetirizine (ZYRTEC) 10 MG tablet          Review of Systems   All other systems reviewed and are negative.      Physical Exam   BP: 130/83  Pulse: 67  Temp: 98.4  F (36.9  C)  Resp: 18  Weight: 117 kg (258 lb)  SpO2: 99 %      Physical Exam  Vitals and nursing note reviewed.   Constitutional:       General: He is not in acute distress.     Appearance: He is well-developed. He is not diaphoretic.   HENT:      Head: Normocephalic and atraumatic.      Nose: Nose normal. No congestion.      Mouth/Throat:      Mouth: Mucous membranes are moist.      Pharynx: Oropharynx is clear.   Eyes:      General: No scleral icterus.     Extraocular Movements: Extraocular movements intact.      Conjunctiva/sclera: Conjunctivae normal.      Pupils: Pupils are equal, round, and reactive to light.   Cardiovascular:      Rate and Rhythm: Normal rate.   Pulmonary:      Effort: Pulmonary effort is normal.   Abdominal:      General: Abdomen is flat.   Genitourinary:     Penis: Normal.       Comments: Inspection is normal.  Testes are descended in the scrotal sac with normal orientation.  Small mass on the superior aspect of the left testicle  which is mildly tender to palpation..  Musculoskeletal:         General: No tenderness or deformity. Normal range of motion.      Cervical back: Normal range of motion and neck supple.   Lymphadenopathy:      Cervical: No cervical adenopathy.   Skin:     General: Skin is warm and dry.      Capillary Refill: Capillary refill takes less than 2 seconds.      Findings: No rash.   Neurological:      General: No focal deficit present.      Mental Status: He is alert and oriented to person, place, and time.      Cranial Nerves: No cranial nerve deficit.      Sensory: No sensory deficit.      Coordination: Coordination normal.         ED Course        Procedures             Recent Results (from the past 24 hours)   UA Macroscopic with reflex to Microscopic and Culture - Clinic Collect    Specimen: Urine, Clean Catch   Result Value Ref Range    Color Urine Yellow Colorless, Straw, Light Yellow, Yellow    Appearance Urine Clear Clear    Glucose Urine Negative Negative mg/dL    Bilirubin Urine Negative Negative    Ketones Urine Trace (A) Negative mg/dL    Specific Gravity Urine >=1.030 1.003 - 1.035    Blood Urine Negative Negative    pH Urine 6.0 5.0 - 7.0    Protein Albumin Urine Negative Negative mg/dL    Urobilinogen Urine 1.0 0.2, 1.0 E.U./dL    Nitrite Urine Negative Negative    Leukocyte Esterase Urine Negative Negative    Narrative    Microscopic not indicated   US Testicular & Scrotum w Doppler Ltd    Narrative    EXAM: US TESTICULAR AND SCROTUM WITH DOPPLER LIMITED  LOCATION: [facility]  DATE: 7/29/2025    INDICATION: testicular pain left  COMPARISON: None.  TECHNIQUE: Ultrasound of scrotum with color flow and spectral Doppler with waveform analysis performed.    FINDINGS:    RIGHT: Right testicle measures 5.2 x 2.8 x 2.2 cm. Normal testicle with no masses. Normal arterial duplex and normal color flow. Normal epididymis. Small hydrocele. No varicocele.    LEFT: Left testicle measures  5.2 x 2.9 x 2.0 cm. Normal testicle with no masses. Normal arterial duplex and normal color flow. Tiny 0.4 x 0.4 x 0.3 cm unilocular epididymal head cyst/spermatocele, which appears to correspond to the palpable finding.   Small hydrocele. No varicocele.      Impression    IMPRESSION:  1.  Tiny left epididymal head cyst/spermatocele, which appears to correspond to the palpable finding.  2.  Otherwise normal ultrasound of the scrotum.         Medications - No data to display    Assessments & Plan (with Medical Decision Making)  24-year-old with left testicular discomfort.  Small spermatocele found on ultrasound.  Urinalysis is negative.  Patient was advised to use conservative measures such as ice, ibuprofen, supportive underwear.  If no improvement he will need follow-up with urology but this is unlikely to be intervened on with any surgical procedure.  Patient was discharged home in stable condition.  Return to ER precautions and follow-up precautions discussed.  All questions answered prior to discharge.     I have reviewed the nursing notes.    I have reviewed the findings, diagnosis, plan and need for follow up with the patient.        Discharge Medication List as of 7/29/2025  5:49 PM          Final diagnoses:   Spermatocele       7/29/2025   [facility] EMERGENCY DEPT       Rah Martinez MD  07/29/25 1818

## 2025-07-29 NOTE — PROGRESS NOTES
Urgent Care Clinic Visit    Chief Complaint   Patient presents with    testicle pain     Pain in left testicle that has gotten worse over the last couple days, started 5 days ago.  Pt states he has felt a small lump or nodule. If pt moves the wrong way it hurts worse.  Pt denies injury.               7/29/2025    12:14 PM   Additional Questions   Roomed by Deisy KWAN   Accompanied by Wife and 2 small children

## 2025-07-29 NOTE — PROGRESS NOTES
"Assessment & Plan     Testicular pain, left  - UA Macroscopic with reflex to Microscopic and Culture - Clinic Collect  - Chlamydia & Gonorrhea by PCR, GICH/Range - Clinic Collect  - Patient will go to the emergency room     Macey So NP  [facility] [place]    Deborah Burgess is a 24 year old male who presents to clinic today for the following health issues:  Chief Complaint   Patient presents with    testicle pain     Pain in left testicle that has gotten worse over the last couple days, started 5 days ago.  If pt moves the wrong way it hurts worse.  Pt denies injury.         7/29/2025    12:14 PM   Additional Questions   Roomed by Deisy KWAN   Accompanied by Wife and 2 small children   Left testicular pain with a lump   Patient noted \"lump maybe not a lump but not symmetrical of the left testicle December 2024\" and patient noted pain one week ago gradual onset but with movement 5-6/10 and then last night at work it got worse 7/10 and it hurts more with walking. Patient states when he sits 1-2/10 but if crossing legs it will hurt 6/10 or higher. Patient notices light redness over the area  Course of illness is worsening  Severity moderately severe  Current and associated symptoms denied   Patient denied any dysuria, frequency, urgency, burning, blood in urine, puss in urine, hesitancy, suprapubic pain and pressure, back pain, nausea, vomiting, fever, and chills.  Treatment and measures tried not moving or laying down but stitl 1-2/10   Predisposing factors include   Patient has no history of pyelonephritis and kidney stones  Patient denies rigors, flank pain, temperature > 101 degrees F., and vomiting    Patient has never had p    Review of Systems   Constitutional:  Negative for chills, fatigue and fever.   Gastrointestinal:  Negative for nausea and vomiting.   Genitourinary:  Positive for scrotal swelling and testicular pain. Negative for decreased urine volume, difficulty urinating, " dysuria, enuresis, flank pain, frequency, genital sores, hematuria, penile discharge, penile pain, penile swelling and urgency.   Skin:  Negative for rash and wound.   Psychiatric/Behavioral:  Negative for confusion.      Patient is a disel        Objective    /74   Pulse 79   Temp 97.4  F (36.3  C) (Tympanic)   Resp 15   Wt 117 kg (258 lb)   SpO2 99%   BMI 36.16 kg/m    Physical Exam  Vitals and nursing note reviewed.   Constitutional:       Appearance: Normal appearance.   Cardiovascular:      Rate and Rhythm: Normal rate and regular rhythm.   Pulmonary:      Effort: Pulmonary effort is normal.      Breath sounds: Normal breath sounds.   Abdominal:      General: Abdomen is flat. Bowel sounds are normal.      Palpations: Abdomen is soft.   Genitourinary:     Penis: Normal.       Comments: Left anterior area point tenderness 2 to 3 mm and 7-8/10 tenderness   Neurological:      Mental Status: He is alert.   Psychiatric:         Mood and Affect: Mood normal.         Behavior: Behavior normal.

## 2025-07-29 NOTE — MEDICATION SCRIBE - ADMISSION MEDICATION HISTORY
Medication Scribe Admission Medication History    Admission medication history is complete. The information provided in this note is only as accurate as the sources available at the time of the update.    Information Source(s): Patient and CareEverywhere/[employer]Scripts via in-person    Pertinent Information: Verified no use of pain pump, inhalers or prescription eye drops currently.      Changes made to PTA medication list:  Added: None  Deleted: claritin, D3  Changed: None    Allergies reviewed with patient and updates made in EHR: yes    Medication History Completed By: MALU DONOVAN 7/29/2025 5:35 PM    PTA Med List   Medication Sig Last Dose/Taking    cetirizine (ZYRTEC) 10 MG tablet Take 10 mg by mouth daily. 7/28/2025 Morning

## 2025-07-29 NOTE — DISCHARGE INSTRUCTIONS
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  Function is usually not determine what the cause is but it is not dangerous and does not turn into cancer.  Symptomatic care is recommended.  Antibiotics do not help.  Use ice packs jockey underwear ibuprofen.  I hope you get better quickly.

## 2025-07-29 NOTE — ED TRIAGE NOTES
Pt here with lump and pain to left testicle.  Started slowly about a week ago.  Seen in [facility] and sent to ED for US         Triage Assessment (Adult)       Row Name 07/29/25 1511          Triage Assessment    Airway WDL WDL        Respiratory WDL    Respiratory WDL WDL

## 2025-07-29 NOTE — PROGRESS NOTES
Urgent Care Clinic Visit    Chief Complaint   Patient presents with    testicle pain     Pain in left testicle that has gotten worse over the last couple days, started 5 days ago.  If pt moves the wrong way it hurts worse.  Pt denies injury.               7/29/2025    12:14 PM   Additional Questions   Roomed by Deisy KWAN   Accompanied by Wife and 2 small children

## 2025-07-30 LAB
C TRACH DNA SPEC QL PROBE+SIG AMP: NEGATIVE
N GONORRHOEA DNA SPEC QL NAA+PROBE: NEGATIVE
SPECIMEN TYPE: NORMAL